# Patient Record
Sex: MALE | Race: WHITE | Employment: OTHER
[De-identification: names, ages, dates, MRNs, and addresses within clinical notes are randomized per-mention and may not be internally consistent; named-entity substitution may affect disease eponyms.]

---

## 2023-10-13 ENCOUNTER — HOSPITAL ENCOUNTER (OUTPATIENT)
Facility: HOSPITAL | Age: 68
Setting detail: RECURRING SERIES
Discharge: HOME | End: 2023-10-16
Payer: COMMERCIAL

## 2023-10-13 PROCEDURE — 97162 PT EVAL MOD COMPLEX 30 MIN: CPT

## 2023-10-13 NOTE — PROGRESS NOTES
test indicating dec in fall risk. Eval status: 5x STS: 18 inches: 13.63 no UE support from table, feel a little pressure  Current status: same as eval    2) Patient will be able to stand on foam with eyes closed to assist with improving balance on uneven surfaces. Eval status: Foam FT EO: 30 seconds; EC: 30, slight unsteadiness initially  Current status: same as eval    3) Patient will have  FOTO score of 60 or greater in order to progress back to prior level of function. Eval status: 56  Current status: same as eval    4) Pt will have 3 pt inc in TEJEDA balance and FGA score so pt is less of a fall risk.   Eval status: TEJEDA FGA: 24  Current status: same as eval    Frequency / Duration: Patient to be seen 1 times per week for 8 weeks     PLAN  []  Upgrade activities as tolerated     []  Continue plan of care  []  Update interventions per flow sheet       []  Other:_      Gaviota Person, PT, DPT, [facility] 10/13/2023  7:36 AM

## 2023-10-13 NOTE — PROGRESS NOTES
plan and certify that this therapy is necessary. Physician's Signature:________________________Date:_________TIME:________                                      Thang Pollard MD         ** Signature, Date and Time must be completed for valid certification **    Insurance: Payor: Jerel Gandara / Plan: Jennifer López / Product Type: *No Product type* /      Please sign and return to :   [facility] at the [address]           Phone: [phone]      Fax:  [phone]

## 2024-05-30 ENCOUNTER — APPOINTMENT (OUTPATIENT)
Facility: HOSPITAL | Age: 69
End: 2024-05-30
Payer: COMMERCIAL

## 2024-05-30 ENCOUNTER — HOSPITAL ENCOUNTER (EMERGENCY)
Facility: HOSPITAL | Age: 69
Discharge: HOME | End: 2024-05-30
Attending: EMERGENCY MEDICINE
Payer: COMMERCIAL

## 2024-05-30 VITALS
OXYGEN SATURATION: 95 % | RESPIRATION RATE: 20 BRPM | BODY MASS INDEX: 32.88 KG/M2 | HEIGHT: 76 IN | SYSTOLIC BLOOD PRESSURE: 142 MMHG | WEIGHT: 270 LBS | HEART RATE: 57 BPM | DIASTOLIC BLOOD PRESSURE: 89 MMHG | TEMPERATURE: 97.7 F

## 2024-05-30 DIAGNOSIS — R55 SYNCOPE AND COLLAPSE: Primary | ICD-10-CM

## 2024-05-30 LAB
ALBUMIN SERPL-MCNC: 4.4 G/DL (ref 3.4–5)
ALBUMIN/GLOB SERPL: 1.4 (ref 0.8–1.7)
ALP SERPL-CCNC: 50 U/L (ref 45–117)
ALT SERPL-CCNC: 38 U/L (ref 16–61)
ANION GAP SERPL CALC-SCNC: 7 MMOL/L (ref 3–18)
AST SERPL-CCNC: 17 U/L (ref 10–38)
BASOPHILS # BLD: 0 K/UL (ref 0–0.1)
BASOPHILS NFR BLD: 0 % (ref 0–2)
BILIRUB SERPL-MCNC: 0.6 MG/DL (ref 0.2–1)
BUN SERPL-MCNC: 16 MG/DL (ref 7–18)
BUN/CREAT SERPL: 14 (ref 12–20)
CALCIUM SERPL-MCNC: 10 MG/DL (ref 8.5–10.1)
CHLORIDE SERPL-SCNC: 104 MMOL/L (ref 100–111)
CO2 SERPL-SCNC: 27 MMOL/L (ref 21–32)
CREAT SERPL-MCNC: 1.11 MG/DL (ref 0.6–1.3)
DIFFERENTIAL METHOD BLD: ABNORMAL
EOSINOPHIL # BLD: 0.1 K/UL (ref 0–0.4)
EOSINOPHIL NFR BLD: 1 % (ref 0–5)
ERYTHROCYTE [DISTWIDTH] IN BLOOD BY AUTOMATED COUNT: 12.8 % (ref 11.6–14.5)
ETHANOL SERPL-MCNC: <3 MG/DL (ref 0–3)
GLOBULIN SER CALC-MCNC: 3.2 G/DL (ref 2–4)
GLUCOSE BLD STRIP.AUTO-MCNC: 122 MG/DL (ref 70–110)
GLUCOSE SERPL-MCNC: 116 MG/DL (ref 74–99)
HCT VFR BLD AUTO: 44.4 % (ref 36–48)
HGB BLD-MCNC: 14.8 G/DL (ref 13–16)
IMM GRANULOCYTES # BLD AUTO: 0.1 K/UL (ref 0–0.04)
IMM GRANULOCYTES NFR BLD AUTO: 1 % (ref 0–0.5)
LYMPHOCYTES # BLD: 2 K/UL (ref 0.9–3.6)
LYMPHOCYTES NFR BLD: 22 % (ref 21–52)
MAGNESIUM SERPL-MCNC: 2.4 MG/DL (ref 1.6–2.6)
MCH RBC QN AUTO: 31 PG (ref 24–34)
MCHC RBC AUTO-ENTMCNC: 33.3 G/DL (ref 31–37)
MCV RBC AUTO: 93.1 FL (ref 78–100)
MONOCYTES # BLD: 0.5 K/UL (ref 0.05–1.2)
MONOCYTES NFR BLD: 6 % (ref 3–10)
NEUTS SEG # BLD: 6.2 K/UL (ref 1.8–8)
NEUTS SEG NFR BLD: 70 % (ref 40–73)
NRBC # BLD: 0 K/UL (ref 0–0.01)
NRBC BLD-RTO: 0 PER 100 WBC
PLATELET # BLD AUTO: 289 K/UL (ref 135–420)
PMV BLD AUTO: 10.1 FL (ref 9.2–11.8)
POTASSIUM SERPL-SCNC: 4.3 MMOL/L (ref 3.5–5.5)
PROT SERPL-MCNC: 7.6 G/DL (ref 6.4–8.2)
RBC # BLD AUTO: 4.77 M/UL (ref 4.35–5.65)
SODIUM SERPL-SCNC: 138 MMOL/L (ref 136–145)
TROPONIN I SERPL HS-MCNC: 4 NG/L (ref 0–78)
TROPONIN I SERPL HS-MCNC: 5 NG/L (ref 0–78)
WBC # BLD AUTO: 8.9 K/UL (ref 4.6–13.2)

## 2024-05-30 PROCEDURE — 80053 COMPREHEN METABOLIC PANEL: CPT

## 2024-05-30 PROCEDURE — 84484 ASSAY OF TROPONIN QUANT: CPT

## 2024-05-30 PROCEDURE — 85025 COMPLETE CBC W/AUTO DIFF WBC: CPT

## 2024-05-30 PROCEDURE — 82962 GLUCOSE BLOOD TEST: CPT

## 2024-05-30 PROCEDURE — 99285 EMERGENCY DEPT VISIT HI MDM: CPT

## 2024-05-30 PROCEDURE — 82077 ASSAY SPEC XCP UR&BREATH IA: CPT

## 2024-05-30 PROCEDURE — 83735 ASSAY OF MAGNESIUM: CPT

## 2024-05-30 PROCEDURE — 93005 ELECTROCARDIOGRAM TRACING: CPT | Performed by: EMERGENCY MEDICINE

## 2024-05-30 PROCEDURE — 71045 X-RAY EXAM CHEST 1 VIEW: CPT

## 2024-05-30 NOTE — ED TRIAGE NOTES
Pt c/o syncopal episode at home after becoming lightheaded. Pt states he was in a sitting position when he lost consciousness. Pt states he was difficult to arouse initially. Pt A&Ox4 answering questions appropriately with no dizziness or blurred vision in triage, but c/o a \"slight headache\".  Pt has full +ROM to all extremities with no obvious deformities and ambulatory with steady gait without difficulty. No reports of cp or sob. WV=128 in triage.

## 2024-05-30 NOTE — ED PROVIDER NOTES
[facility] EMERGENCY DEPT  EMERGENCY DEPARTMENT ENCOUNTER    Patient Name: Pancho Barrera  MRN: [id number]  YOB: 1955  Provider: Cullen Gonzalez MD  PCP: Demi Malcolm MD   Time/Date of evaluation: 7:16 PM EDT on 5/30/24    History of Presenting Illness     History Provided by: Patient  History is limited by: Nothing     HISTORY:   Pancho Barrera is a 69 y.o. male presenting after syncopal episode while at home.  He said he was feeling very lightheaded went to the couch and while on the couch his family witnessed him lose consciousness briefly.  He says he feels a little lightheaded now but otherwise normal.  Denies any chest pain shortness of breath or palpitations.  Denies any abdominal pain or back pain.  Denies any bloody or black tarry stools.  A similar episode happened about a month ago but at that time he did not pass all the way out.  Does have a history of myasthenia gravis currently on pyridostigmine mycophenolate and prednisone daily.  He additionally has a history of hypertension hyperlipidemia.    Nursing Notes were all reviewed and agreed with or any disagreements were addressed in the HPI.    Past History     PAST MEDICAL HISTORY:  Past Medical History:   Diagnosis Date    Myasthenia gravis with exacerbation (HCC)        PAST SURGICAL HISTORY:  No past surgical history on file.    FAMILY HISTORY:  No family history on file.    SOCIAL HISTORY:       MEDICATIONS:  No current facility-administered medications for this encounter.     No current outpatient medications on file.       ALLERGIES:  Allergies   Allergen Reactions    Penicillins Other (See Comments)     Happen when kid, unknown reaction       SOCIAL DETERMINANTS OF HEALTH:  Social Determinants of Health     Tobacco Use: Not on file   Alcohol Use: Not on file   Financial Resource Strain: Not on file   Food Insecurity: Not on file   Transportation Needs: Not on file   Physical Activity: Not on file   Stress: Not on file  Ref Range    Troponin, High Sensitivity 5 0 - 78 ng/L       RADIOLOGIC STUDIES:   Non x-ray images such as CT, Ultrasound and MRI are read by the radiologist. X-ray images are visualized and preliminarily interpreted by the ED Provider with the findings as listed in the ED Course section below.     Interpretation per the Radiologist is listed below, if available at the time of this note:    XR CHEST 1 VIEW   Final Result   No acute cardiopulmonary process.             Procedures     Procedures    ED Course      7:16 PM EDT I (Cullen Gonzalez MD) am the first provider for this patient. Initial assessment performed. I reviewed the vital signs, available nursing notes, past medical history, past surgical history, family history and social history. The patients presenting problems have been discussed, and they are in agreement with the care plan formulated and outlined with them. I have encouraged them to ask questions as they arise throughout their visit.    Arrival: Patient appears after symptomatic.  No focal neurologic deficits on examination.    Vital signs: Stable.    EKG: A 12 lead EKG was performed, interpreted by me.  Rate 62, sinus rhythm, normal axis, no significant ST elevation depressions or T wave version, QTc within normal limits.    Labs: CBC, CMP, troponin all show no acute abnormalities    Imaging: Chest x-ray is normal    RECORDS REVIEWED: Nursing Notes    Is this patient to be included in the SEP-1 core measure? No Exclusion criteria - the patient is NOT to be included for SEP-1 Core Measure due to: Infection is not suspected    MEDICATIONS ADMINISTERED IN THE ED:  Medications - No data to display         None    Medical Decision Making   My differential diagnosis on first evaluation of the patient included but was not limited to vasovagal syncope, cardiac syncope.  Seizure is unlikely based on the history.  Overall labs imaging and EKG all very reassuring.    I utilized an evidence-based risk

## 2024-05-31 LAB
EKG ATRIAL RATE: 62 BPM
EKG DIAGNOSIS: NORMAL
EKG P AXIS: 34 DEGREES
EKG P-R INTERVAL: 162 MS
EKG Q-T INTERVAL: 396 MS
EKG QRS DURATION: 84 MS
EKG QTC CALCULATION (BAZETT): 401 MS
EKG R AXIS: 22 DEGREES
EKG T AXIS: 52 DEGREES
EKG VENTRICULAR RATE: 62 BPM

## 2024-05-31 NOTE — ED NOTES
Two attempts at blood draw for repeat troponin unsuccessful; Kellie, ED technician, attempting att. Pt tolerated well, no complications noted, dressings applied at both sites of venipuncture.

## 2024-07-17 ENCOUNTER — HOSPITAL ENCOUNTER (OUTPATIENT)
Facility: HOSPITAL | Age: 69
Discharge: HOME | End: 2024-07-20
Payer: COMMERCIAL

## 2024-07-17 DIAGNOSIS — R55 SYNCOPE AND COLLAPSE: ICD-10-CM

## 2024-07-17 PROCEDURE — A9579 GAD-BASE MR CONTRAST NOS,1ML: HCPCS | Performed by: PSYCHIATRY & NEUROLOGY

## 2024-07-17 PROCEDURE — 6360000004 HC RX CONTRAST MEDICATION: Performed by: PSYCHIATRY & NEUROLOGY

## 2024-07-17 PROCEDURE — 70553 MRI BRAIN STEM W/O & W/DYE: CPT

## 2024-07-17 RX ADMIN — GADOTERIDOL 20 ML: 279.3 INJECTION, SOLUTION INTRAVENOUS at 12:39
